# Patient Record
Sex: FEMALE | Race: WHITE | NOT HISPANIC OR LATINO | Employment: FULL TIME | URBAN - METROPOLITAN AREA
[De-identification: names, ages, dates, MRNs, and addresses within clinical notes are randomized per-mention and may not be internally consistent; named-entity substitution may affect disease eponyms.]

---

## 2023-03-13 DIAGNOSIS — G43.009 MIGRAINE WITHOUT AURA AND WITHOUT STATUS MIGRAINOSUS, NOT INTRACTABLE: Primary | ICD-10-CM

## 2023-03-13 DIAGNOSIS — E61.1 IRON DEFICIENCY: ICD-10-CM

## 2023-03-13 RX ORDER — TOPIRAMATE 50 MG/1
50 TABLET, FILM COATED ORAL DAILY
Qty: 90 TABLET | Refills: 1 | Status: SHIPPED | OUTPATIENT
Start: 2023-03-13 | End: 2023-08-17 | Stop reason: SDUPTHER

## 2023-03-13 RX ORDER — TOPIRAMATE 50 MG/1
50 TABLET, FILM COATED ORAL DAILY
COMMUNITY
End: 2023-03-13 | Stop reason: SDUPTHER

## 2023-03-13 RX ORDER — FERROUS SULFATE TAB 325 MG (65 MG ELEMENTAL FE) 325 (65 FE) MG
65 TAB ORAL DAILY
COMMUNITY
Start: 2023-01-23 | End: 2023-03-13 | Stop reason: SDUPTHER

## 2023-03-13 RX ORDER — FERROUS SULFATE TAB 325 MG (65 MG ELEMENTAL FE) 325 (65 FE) MG
65 TAB ORAL DAILY
Qty: 90 TABLET | Refills: 1 | Status: SHIPPED | OUTPATIENT
Start: 2023-03-13 | End: 2023-09-25 | Stop reason: SDUPTHER

## 2023-08-17 ENCOUNTER — OFFICE VISIT (OUTPATIENT)
Dept: PRIMARY CARE | Facility: CLINIC | Age: 28
End: 2023-08-17
Payer: COMMERCIAL

## 2023-08-17 VITALS
DIASTOLIC BLOOD PRESSURE: 60 MMHG | BODY MASS INDEX: 18.18 KG/M2 | RESPIRATION RATE: 18 BRPM | HEIGHT: 67 IN | HEART RATE: 58 BPM | OXYGEN SATURATION: 93 % | WEIGHT: 115.8 LBS | SYSTOLIC BLOOD PRESSURE: 104 MMHG

## 2023-08-17 DIAGNOSIS — E61.1 LOW IRON: ICD-10-CM

## 2023-08-17 DIAGNOSIS — G43.009 MIGRAINE WITHOUT AURA AND WITHOUT STATUS MIGRAINOSUS, NOT INTRACTABLE: Primary | ICD-10-CM

## 2023-08-17 PROCEDURE — 1036F TOBACCO NON-USER: CPT | Performed by: STUDENT IN AN ORGANIZED HEALTH CARE EDUCATION/TRAINING PROGRAM

## 2023-08-17 PROCEDURE — 99213 OFFICE O/P EST LOW 20 MIN: CPT | Performed by: STUDENT IN AN ORGANIZED HEALTH CARE EDUCATION/TRAINING PROGRAM

## 2023-08-17 RX ORDER — TIZANIDINE 2 MG/1
TABLET ORAL
COMMUNITY
Start: 2022-09-29 | End: 2024-02-22 | Stop reason: WASHOUT

## 2023-08-17 RX ORDER — SUMATRIPTAN 50 MG/1
TABLET, FILM COATED ORAL
COMMUNITY
Start: 2023-07-13

## 2023-08-17 RX ORDER — TRETINOIN 0.25 MG/G
CREAM TOPICAL
COMMUNITY
Start: 2023-07-25 | End: 2024-02-22 | Stop reason: WASHOUT

## 2023-08-17 RX ORDER — TOPIRAMATE 50 MG/1
50 TABLET, FILM COATED ORAL DAILY
Qty: 90 TABLET | Refills: 1 | Status: SHIPPED | OUTPATIENT
Start: 2023-08-17 | End: 2024-02-26 | Stop reason: WASHOUT

## 2023-08-17 ASSESSMENT — PATIENT HEALTH QUESTIONNAIRE - PHQ9
1. LITTLE INTEREST OR PLEASURE IN DOING THINGS: NOT AT ALL
2. FEELING DOWN, DEPRESSED OR HOPELESS: NOT AT ALL
SUM OF ALL RESPONSES TO PHQ9 QUESTIONS 1 AND 2: 0

## 2023-08-17 NOTE — PROGRESS NOTES
Subjective   Patient ID: Olga Lidia Negro is a 27 y.o. female who presents for Follow-up (Pt is here for a follow up.).    Patient presents to the office today for follow-up.       PMhx: low ferritin, previously low B12   Family Hx: non-contributory   Surgeries   Medicine:   Social Hx: no tobacco, 4 times a year: alcohol, illicit drugs none, , dog, [employer]ly D[employer]   Allergies: NKDA              Review of Systems    Objective   Physical Exam  Vitals reviewed.   Constitutional:       Appearance: Normal appearance.   Cardiovascular:      Rate and Rhythm: Normal rate and regular rhythm.      Heart sounds: No murmur heard.  Pulmonary:      Effort: Pulmonary effort is normal. No respiratory distress.      Breath sounds: Normal breath sounds. No wheezing.   Musculoskeletal:      Cervical back: Neck supple.      Left lower leg: No edema.   Neurological:      Mental Status: She is alert.         Assessment/Plan   Problem List Items Addressed This Visit    None  Visit Diagnoses       Migraine without aura and without status migrainosus, not intractable    -  Primary    stable   continue topamax 50mg daily    Relevant Medications    topiramate (Topamax) 50 mg tablet    Low iron        Relevant Orders    CBC and Auto Differential    Iron and TIBC    Ferritin

## 2023-08-29 ENCOUNTER — HOSPITAL ENCOUNTER (OUTPATIENT)
Dept: DATA CONVERSION | Facility: HOSPITAL | Age: 28
Discharge: HOME | End: 2023-08-29
Payer: COMMERCIAL

## 2023-08-29 DIAGNOSIS — E61.1 IRON DEFICIENCY: ICD-10-CM

## 2023-08-29 LAB
FERRITIN SERPL-MCNC: 174 NG/ML (ref 13–150)
IRON SATN MFR SERPL: 60.4 % (ref 12–50)
IRON SERPL-MCNC: 151 UG/DL (ref 30–160)
TIBC SERPL-MCNC: 250 UG/DL (ref 228–428)

## 2023-09-06 ENCOUNTER — HOSPITAL ENCOUNTER (OUTPATIENT)
Dept: DATA CONVERSION | Facility: HOSPITAL | Age: 28
Discharge: HOME | End: 2023-09-06
Payer: COMMERCIAL

## 2023-09-06 DIAGNOSIS — N93.8 OTHER SPECIFIED ABNORMAL UTERINE AND VAGINAL BLEEDING: ICD-10-CM

## 2023-09-06 DIAGNOSIS — Z01.419 ENCOUNTER FOR GYNECOLOGICAL EXAMINATION (GENERAL) (ROUTINE) WITHOUT ABNORMAL FINDINGS: ICD-10-CM

## 2023-09-06 LAB
FSH SERPL-ACNC: 5.6 MU/ML
LH SERPL-ACNC: 18.9 MU/ML
PROLACTIN SERPL-MCNC: 8.8 NG/ML (ref 4.8–23.3)
T4 FREE SERPL-MCNC: 1.1 NG/DL (ref 0.9–1.7)
TSH SERPL DL<=0.05 MIU/L-ACNC: 2.31 MIU/L (ref 0.27–4.2)

## 2023-09-21 ENCOUNTER — TELEPHONE (OUTPATIENT)
Dept: PRIMARY CARE | Facility: CLINIC | Age: 28
End: 2023-09-21
Payer: COMMERCIAL

## 2023-09-21 DIAGNOSIS — E61.1 IRON DEFICIENCY: ICD-10-CM

## 2023-09-25 RX ORDER — FERROUS SULFATE 325(65) MG
65 TABLET ORAL DAILY
Qty: 90 TABLET | Refills: 1 | Status: SHIPPED | OUTPATIENT
Start: 2023-09-25

## 2024-02-22 ENCOUNTER — OFFICE VISIT (OUTPATIENT)
Dept: PRIMARY CARE | Facility: CLINIC | Age: 29
End: 2024-02-22
Payer: COMMERCIAL

## 2024-02-22 ENCOUNTER — HOSPITAL ENCOUNTER (OUTPATIENT)
Dept: RADIOLOGY | Facility: CLINIC | Age: 29
Discharge: HOME | End: 2024-02-22
Payer: COMMERCIAL

## 2024-02-22 VITALS
WEIGHT: 127.2 LBS | RESPIRATION RATE: 15 BRPM | HEIGHT: 67 IN | HEART RATE: 73 BPM | BODY MASS INDEX: 19.97 KG/M2 | OXYGEN SATURATION: 96 % | DIASTOLIC BLOOD PRESSURE: 64 MMHG | SYSTOLIC BLOOD PRESSURE: 112 MMHG

## 2024-02-22 DIAGNOSIS — Z00.00 ANNUAL PHYSICAL EXAM: Primary | ICD-10-CM

## 2024-02-22 DIAGNOSIS — M53.3 COCCYX PAIN: ICD-10-CM

## 2024-02-22 DIAGNOSIS — G43.009 MIGRAINE WITHOUT AURA AND WITHOUT STATUS MIGRAINOSUS, NOT INTRACTABLE: ICD-10-CM

## 2024-02-22 PROCEDURE — 72220 X-RAY EXAM SACRUM TAILBONE: CPT

## 2024-02-22 PROCEDURE — 99213 OFFICE O/P EST LOW 20 MIN: CPT | Performed by: STUDENT IN AN ORGANIZED HEALTH CARE EDUCATION/TRAINING PROGRAM

## 2024-02-22 PROCEDURE — 72220 X-RAY EXAM SACRUM TAILBONE: CPT | Performed by: RADIOLOGY

## 2024-02-22 PROCEDURE — 1036F TOBACCO NON-USER: CPT | Performed by: STUDENT IN AN ORGANIZED HEALTH CARE EDUCATION/TRAINING PROGRAM

## 2024-02-22 PROCEDURE — 99395 PREV VISIT EST AGE 18-39: CPT | Performed by: STUDENT IN AN ORGANIZED HEALTH CARE EDUCATION/TRAINING PROGRAM

## 2024-02-22 RX ORDER — NITROFURANTOIN 25; 75 MG/1; MG/1
CAPSULE ORAL
COMMUNITY
Start: 2024-02-20

## 2024-02-22 RX ORDER — METFORMIN HYDROCHLORIDE 500 MG/1
TABLET ORAL
COMMUNITY

## 2024-02-22 ASSESSMENT — PATIENT HEALTH QUESTIONNAIRE - PHQ9
1. LITTLE INTEREST OR PLEASURE IN DOING THINGS: NOT AT ALL
SUM OF ALL RESPONSES TO PHQ9 QUESTIONS 1 AND 2: 0
2. FEELING DOWN, DEPRESSED OR HOPELESS: NOT AT ALL

## 2024-02-22 NOTE — PROGRESS NOTES
History Of Present Illness  Olga Lidia Suosa is a 28 y.o. female presents to the office for cpe.    Interval Hx:     Tailbone pain x 3 months   Tailbone, no trauma  Dull, intense pain with initial sitting and standing   Coxy pain, no fall  No swelling, warmth or erythema   Ice and heat        PCOS Dx   Following with GYN  Metformin        PMhx: low ferritin, previously low B12   Family Hx: non-contributory   Surgeries   Medicine:   Social Hx: no tobacco, 4 times a year: alcohol, illicit drugs none, , dog, Ally Derm   Allergies: NKDA     Topamax cessation, pt noticed increase in weight gain   Past Medical History  She has a past medical history of Migraine, unspecified, not intractable, without status migrainosus, Personal history of other diseases of the digestive system, and Personal history of other diseases of the musculoskeletal system and connective tissue.    Surgical History  She has a past surgical history that includes Other surgical history (09/24/2019) and Other surgical history (09/24/2019).     Social History  She reports that she has never smoked. She has never used smokeless tobacco. She reports current alcohol use. She reports that she does not use drugs.    Family History  Family History   Problem Relation Name Age of Onset    No Known Problems Mother      No Known Problems Father          Allergies  Patient has no known allergies.       Physical Exam  Vitals reviewed.   Constitutional:       General: She is not in acute distress.     Appearance: She is not ill-appearing.   HENT:      Right Ear: Tympanic membrane and ear canal normal.      Left Ear: Tympanic membrane and ear canal normal.      Mouth/Throat:      Mouth: Mucous membranes are moist.      Pharynx: Oropharynx is clear. No oropharyngeal exudate or posterior oropharyngeal erythema.   Eyes:      Extraocular Movements: Extraocular movements intact.      Conjunctiva/sclera: Conjunctivae normal.      Pupils: Pupils are equal, round, and  reactive to light.   Neck:      Vascular: No carotid bruit.   Cardiovascular:      Rate and Rhythm: Normal rate and regular rhythm.      Heart sounds: No murmur heard.     No gallop.   Pulmonary:      Effort: Pulmonary effort is normal.      Breath sounds: Normal breath sounds. No wheezing, rhonchi or rales.   Abdominal:      General: Abdomen is flat. Bowel sounds are normal.      Palpations: Abdomen is soft.      Tenderness: There is no abdominal tenderness.   Musculoskeletal:      Cervical back: Neck supple.      Left lower leg: No edema.   Skin:     General: Skin is warm and dry.      Findings: No rash.   Neurological:      General: No focal deficit present.      Mental Status: She is alert and oriented to person, place, and time.      Gait: Gait normal.   Psychiatric:         Mood and Affect: Mood normal.         Behavior: Behavior normal.          Last Recorded Vitals  /64   Pulse 73   Resp 15   Wt 57.7 kg (127 lb 3.2 oz)   SpO2 96%     Relevant Results    Current Outpatient Medications:     ferrous sulfate (FeroSuL) 325 (65 Fe) MG tablet, Take 1 tablet (65 mg of iron) by mouth once daily., Disp: 90 tablet, Rfl: 1    nitrofurantoin, macrocrystal-monohydrate, (Macrobid) 100 mg capsule, , Disp: , Rfl:     SUMAtriptan (Imitrex) 50 mg tablet, , Disp: , Rfl:     metFORMIN (Glucophage) 500 mg tablet, TAKE 1 TABLET BY MOUTH TWICE DAILY WITH A MEAL, Disp: , Rfl:     topiramate (Topamax) 50 mg tablet, Take 1 tablet (50 mg) by mouth once daily. (Patient not taking: Reported on 2/22/2024), Disp: 90 tablet, Rfl: 1        Assessment/Plan   Problem List Items Addressed This Visit    None  Visit Diagnoses         Codes    Coccyx pain    -  Primary M53.3    Relevant Orders    XR sacrum coccyx 2+ views (Completed)        Olga Lidia Negro is a 27 year old female who presents for cpe. Her exam was benign. We updated her tdap and did health maintenance screening.     Coccyx pain   Likely msk   Xray ordered              Pam Lee, DO

## 2024-08-08 ENCOUNTER — LAB REQUISITION (OUTPATIENT)
Dept: LAB | Facility: HOSPITAL | Age: 29
End: 2024-08-08
Payer: COMMERCIAL

## 2024-08-08 DIAGNOSIS — Z34.01 ENCOUNTER FOR SUPERVISION OF NORMAL FIRST PREGNANCY, FIRST TRIMESTER (HHS-HCC): ICD-10-CM

## 2024-08-08 PROCEDURE — 87491 CHLMYD TRACH DNA AMP PROBE: CPT

## 2024-08-08 PROCEDURE — 87591 N.GONORRHOEAE DNA AMP PROB: CPT

## 2024-08-09 LAB
C TRACH RRNA SPEC QL NAA+PROBE: NEGATIVE
N GONORRHOEA DNA SPEC QL PROBE+SIG AMP: NEGATIVE

## 2024-08-19 ENCOUNTER — LAB (OUTPATIENT)
Dept: LAB | Facility: LAB | Age: 29
End: 2024-08-19
Payer: COMMERCIAL

## 2024-08-19 DIAGNOSIS — Z13.79 ENCOUNTER FOR OTHER SCREENING FOR GENETIC AND CHROMOSOMAL ANOMALIES: Primary | ICD-10-CM

## 2024-08-19 DIAGNOSIS — Z34.01 ENCOUNTER FOR SUPERVISION OF NORMAL FIRST PREGNANCY, FIRST TRIMESTER (HHS-HCC): ICD-10-CM

## 2024-08-19 LAB
ABO GROUP (TYPE) IN BLOOD: NORMAL
ANTIBODY SCREEN: NORMAL
ERYTHROCYTE [DISTWIDTH] IN BLOOD BY AUTOMATED COUNT: 11.3 % (ref 11.5–14.5)
EST. AVERAGE GLUCOSE BLD GHB EST-MCNC: 105 MG/DL
HBA1C MFR BLD: 5.3 %
HCT VFR BLD AUTO: 37.6 % (ref 36–46)
HGB BLD-MCNC: 12.9 G/DL (ref 12–16)
MCH RBC QN AUTO: 31.4 PG (ref 26–34)
MCHC RBC AUTO-ENTMCNC: 34.3 G/DL (ref 32–36)
MCV RBC AUTO: 92 FL (ref 80–100)
NRBC BLD-RTO: 0 /100 WBCS (ref 0–0)
PLATELET # BLD AUTO: 280 X10*3/UL (ref 150–450)
RBC # BLD AUTO: 4.11 X10*6/UL (ref 4–5.2)
RH FACTOR (ANTIGEN D): NORMAL
WBC # BLD AUTO: 10.2 X10*3/UL (ref 4.4–11.3)

## 2024-08-19 PROCEDURE — 87389 HIV-1 AG W/HIV-1&-2 AB AG IA: CPT

## 2024-08-19 PROCEDURE — 86900 BLOOD TYPING SEROLOGIC ABO: CPT

## 2024-08-19 PROCEDURE — 86901 BLOOD TYPING SEROLOGIC RH(D): CPT

## 2024-08-19 PROCEDURE — 87340 HEPATITIS B SURFACE AG IA: CPT

## 2024-08-19 PROCEDURE — 86317 IMMUNOASSAY INFECTIOUS AGENT: CPT

## 2024-08-19 PROCEDURE — 87086 URINE CULTURE/COLONY COUNT: CPT

## 2024-08-19 PROCEDURE — 83036 HEMOGLOBIN GLYCOSYLATED A1C: CPT

## 2024-08-19 PROCEDURE — 86780 TREPONEMA PALLIDUM: CPT

## 2024-08-19 PROCEDURE — 36415 COLL VENOUS BLD VENIPUNCTURE: CPT

## 2024-08-19 PROCEDURE — 85027 COMPLETE CBC AUTOMATED: CPT

## 2024-08-19 PROCEDURE — 86803 HEPATITIS C AB TEST: CPT

## 2024-08-19 PROCEDURE — 86850 RBC ANTIBODY SCREEN: CPT

## 2024-08-20 LAB
BACTERIA UR CULT: NORMAL
HBV SURFACE AG SERPL QL IA: NONREACTIVE
HCV AB SER QL: NONREACTIVE
HIV 1+2 AB+HIV1 P24 AG SERPL QL IA: NONREACTIVE
RUBV IGG SERPL IA-ACNC: 1.8 IA
RUBV IGG SERPL QL IA: POSITIVE
TREPONEMA PALLIDUM IGG+IGM AB [PRESENCE] IN SERUM OR PLASMA BY IMMUNOASSAY: NONREACTIVE

## 2024-08-21 LAB
COMMENTS - MP RESULT TYPE: NORMAL
COMMENTS - MP RESULT TYPE: NORMAL
SCAN RESULT: NORMAL
SCAN RESULT: NORMAL

## 2024-09-04 ENCOUNTER — APPOINTMENT (OUTPATIENT)
Dept: OTOLARYNGOLOGY | Facility: CLINIC | Age: 29
End: 2024-09-04
Payer: COMMERCIAL

## 2024-10-14 ENCOUNTER — APPOINTMENT (OUTPATIENT)
Dept: OTOLARYNGOLOGY | Facility: CLINIC | Age: 29
End: 2024-10-14
Payer: COMMERCIAL

## 2024-12-14 ENCOUNTER — LAB (OUTPATIENT)
Dept: LAB | Facility: LAB | Age: 29
End: 2024-12-14
Payer: COMMERCIAL

## 2024-12-14 DIAGNOSIS — Z34.02 ENCOUNTER FOR SUPERVISION OF NORMAL FIRST PREGNANCY, SECOND TRIMESTER: Primary | ICD-10-CM

## 2024-12-14 LAB
ERYTHROCYTE [DISTWIDTH] IN BLOOD BY AUTOMATED COUNT: 12.3 % (ref 11.5–14.5)
GLUCOSE 1H P 50 G GLC PO SERPL-MCNC: 93 MG/DL
HCT VFR BLD AUTO: 35.8 % (ref 36–46)
HGB BLD-MCNC: 12 G/DL (ref 12–16)
MCH RBC QN AUTO: 33.1 PG (ref 26–34)
MCHC RBC AUTO-ENTMCNC: 33.5 G/DL (ref 32–36)
MCV RBC AUTO: 99 FL (ref 80–100)
NRBC BLD-RTO: 0 /100 WBCS (ref 0–0)
PLATELET # BLD AUTO: 231 X10*3/UL (ref 150–450)
RBC # BLD AUTO: 3.63 X10*6/UL (ref 4–5.2)
REFLEX ADDED, ANEMIA PANEL: NORMAL
WBC # BLD AUTO: 8.8 X10*3/UL (ref 4.4–11.3)

## 2024-12-14 PROCEDURE — 36415 COLL VENOUS BLD VENIPUNCTURE: CPT

## 2024-12-14 PROCEDURE — 85027 COMPLETE CBC AUTOMATED: CPT

## 2024-12-14 PROCEDURE — 82947 ASSAY GLUCOSE BLOOD QUANT: CPT

## 2025-02-26 ASSESSMENT — PROMIS GLOBAL HEALTH SCALE
RATE_QUALITY_OF_LIFE: EXCELLENT
RATE_AVERAGE_FATIGUE: MODERATE
RATE_AVERAGE_PAIN: 5
CARRYOUT_PHYSICAL_ACTIVITIES: MOSTLY
RATE_MENTAL_HEALTH: EXCELLENT
RATE_PHYSICAL_HEALTH: VERY GOOD
EMOTIONAL_PROBLEMS: NEVER
RATE_SOCIAL_SATISFACTION: VERY GOOD
RATE_GENERAL_HEALTH: VERY GOOD
CARRYOUT_SOCIAL_ACTIVITIES: EXCELLENT

## 2025-02-27 ENCOUNTER — APPOINTMENT (OUTPATIENT)
Dept: PRIMARY CARE | Facility: CLINIC | Age: 30
End: 2025-02-27
Payer: COMMERCIAL

## 2025-02-27 VITALS
BODY MASS INDEX: 25.24 KG/M2 | HEART RATE: 94 BPM | SYSTOLIC BLOOD PRESSURE: 102 MMHG | OXYGEN SATURATION: 100 % | DIASTOLIC BLOOD PRESSURE: 58 MMHG | HEIGHT: 67 IN | WEIGHT: 160.8 LBS

## 2025-02-27 DIAGNOSIS — Z00.00 ANNUAL PHYSICAL EXAM: Primary | ICD-10-CM

## 2025-02-27 DIAGNOSIS — Z34.90 PREGNANCY, UNSPECIFIED GESTATIONAL AGE (HHS-HCC): ICD-10-CM

## 2025-02-27 PROCEDURE — 3008F BODY MASS INDEX DOCD: CPT | Performed by: STUDENT IN AN ORGANIZED HEALTH CARE EDUCATION/TRAINING PROGRAM

## 2025-02-27 PROCEDURE — 99395 PREV VISIT EST AGE 18-39: CPT | Performed by: STUDENT IN AN ORGANIZED HEALTH CARE EDUCATION/TRAINING PROGRAM

## 2025-02-27 PROCEDURE — 1036F TOBACCO NON-USER: CPT | Performed by: STUDENT IN AN ORGANIZED HEALTH CARE EDUCATION/TRAINING PROGRAM

## 2025-02-27 ASSESSMENT — PATIENT HEALTH QUESTIONNAIRE - PHQ9
SUM OF ALL RESPONSES TO PHQ9 QUESTIONS 1 AND 2: 0
2. FEELING DOWN, DEPRESSED OR HOPELESS: NOT AT ALL
1. LITTLE INTEREST OR PLEASURE IN DOING THINGS: NOT AT ALL

## 2025-02-27 NOTE — PROGRESS NOTES
History Of Present Illness  Olga Lidia Sousa is a 29 y.o. female presents for cpe.      Pregnancy 37 weeks   Getting induced on the 3/12   Baby moving well     PMhx: low ferritin, previously low B12   Family Hx: non-contributory   Surgeries   Medicine:   Social Hx: no tobacco, 4 times a year: alcohol, illicit drugs none, , dog, Ally Derm   Allergies: NKDA      Migraines  Hasn't had one since July, 1 the entire pregnancy      Past Medical History  She has a past medical history of GERD (gastroesophageal reflux disease), Migraine, unspecified, not intractable, without status migrainosus, Personal history of other diseases of the digestive system, and Personal history of other diseases of the musculoskeletal system and connective tissue.    Surgical History  She has a past surgical history that includes Other surgical history (09/24/2019) and Other surgical history (09/24/2019).     Social History  She reports that she has never smoked. She has never used smokeless tobacco. She reports current alcohol use. She reports that she does not use drugs.    Family History  Family History   Problem Relation Name Age of Onset    No Known Problems Mother      No Known Problems Father          Allergies  Patient has no known allergies.    Physical Exam  Vitals reviewed.   Constitutional:       General: She is not in acute distress.     Appearance: She is not ill-appearing.   HENT:      Right Ear: Tympanic membrane and ear canal normal.      Left Ear: Tympanic membrane and ear canal normal.      Mouth/Throat:      Mouth: Mucous membranes are moist.      Pharynx: Oropharynx is clear. No oropharyngeal exudate or posterior oropharyngeal erythema.   Eyes:      Extraocular Movements: Extraocular movements intact.      Conjunctiva/sclera: Conjunctivae normal.      Pupils: Pupils are equal, round, and reactive to light.   Neck:      Vascular: No carotid bruit.   Cardiovascular:      Rate and Rhythm: Normal rate and regular rhythm.       Heart sounds: No murmur heard.     No gallop.   Pulmonary:      Effort: Pulmonary effort is normal.      Breath sounds: Normal breath sounds. No wheezing, rhonchi or rales.   Abdominal:      General: Abdomen is flat. Bowel sounds are normal.      Palpations: Abdomen is soft.      Tenderness: There is no abdominal tenderness.   Musculoskeletal:      Cervical back: Neck supple.      Left lower leg: No edema.   Skin:     General: Skin is warm and dry.      Findings: No rash.   Neurological:      General: No focal deficit present.      Mental Status: She is alert and oriented to person, place, and time.      Gait: Gait normal.   Psychiatric:         Mood and Affect: Mood normal.         Behavior: Behavior normal.          Last Recorded Vitals  /58   Pulse 94   Wt 72.9 kg (160 lb 12.8 oz)   SpO2 100%     Relevant Results      Current Outpatient Medications:     ferrous sulfate (FeroSuL) 325 (65 Fe) MG tablet, Take 1 tablet (65 mg of iron) by mouth once daily., Disp: 90 tablet, Rfl: 1         Assessment/Plan   There are no diagnoses linked to this encounter.  Olga Lidia Negro is a 27 year old female who presents for cpe. Her exam was sig for pregnancy.      Migraines   Resolved       Health Maintenance   Topic Date Due    Yearly Adult Physical  Never done    Varicella Vaccines (2 of 2 - 2-dose childhood series) 10/08/2003    Influenza Vaccine (1) 09/01/2024    COVID-19 Vaccine (3 - 2024-25 season) 09/01/2024    Lipid Panel  08/19/2025    Cervical Cancer Screening  09/06/2026    DTaP/Tdap/Td Vaccines (9 - Td or Tdap) 02/21/2033    Zoster Vaccines (1 of 2) 09/05/2047    HIB Vaccines  Completed    Hepatitis B Vaccines  Completed    IPV Vaccines  Completed    Hepatitis A Vaccines  Completed    MMR Vaccines  Completed    Meningococcal Vaccine  Completed    HPV Vaccines  Completed    HIV Screening  Completed    Hepatitis C Screening  Completed    Rotavirus Vaccines  Aged Out    Pneumococcal Vaccine: Pediatrics and  At-Risk Adult Patients  Aged Out            Pam Lee DO

## 2025-03-12 ENCOUNTER — APPOINTMENT (OUTPATIENT)
Dept: OBSTETRICS AND GYNECOLOGY | Facility: HOSPITAL | Age: 30
End: 2025-03-12
Payer: COMMERCIAL

## 2025-03-12 ENCOUNTER — ANESTHESIA EVENT (OUTPATIENT)
Dept: OBSTETRICS AND GYNECOLOGY | Facility: HOSPITAL | Age: 30
End: 2025-03-12
Payer: COMMERCIAL

## 2025-03-12 ENCOUNTER — HOSPITAL ENCOUNTER (INPATIENT)
Facility: HOSPITAL | Age: 30
LOS: 3 days | Discharge: HOME | End: 2025-03-15
Payer: COMMERCIAL

## 2025-03-12 ENCOUNTER — ANESTHESIA (OUTPATIENT)
Dept: OBSTETRICS AND GYNECOLOGY | Facility: HOSPITAL | Age: 30
End: 2025-03-12
Payer: COMMERCIAL

## 2025-03-12 DIAGNOSIS — Z34.90 TERM PREGNANCY (HHS-HCC): Primary | ICD-10-CM

## 2025-03-12 LAB
ABO GROUP (TYPE) IN BLOOD: NORMAL
ANTIBODY SCREEN: NORMAL
ERYTHROCYTE [DISTWIDTH] IN BLOOD BY AUTOMATED COUNT: 12.1 % (ref 11.5–14.5)
HCT VFR BLD AUTO: 33.3 % (ref 36–46)
HGB BLD-MCNC: 11.1 G/DL (ref 12–16)
MCH RBC QN AUTO: 30.8 PG (ref 26–34)
MCHC RBC AUTO-ENTMCNC: 33.3 G/DL (ref 32–36)
MCV RBC AUTO: 93 FL (ref 80–100)
NRBC BLD-RTO: 0 /100 WBCS (ref 0–0)
PLATELET # BLD AUTO: 284 X10*3/UL (ref 150–450)
RBC # BLD AUTO: 3.6 X10*6/UL (ref 4–5.2)
RH FACTOR (ANTIGEN D): NORMAL
WBC # BLD AUTO: 11.4 X10*3/UL (ref 4.4–11.3)

## 2025-03-12 PROCEDURE — 59050 FETAL MONITOR W/REPORT: CPT

## 2025-03-12 PROCEDURE — 2500000004 HC RX 250 GENERAL PHARMACY W/ HCPCS (ALT 636 FOR OP/ED): Performed by: NURSE ANESTHETIST, CERTIFIED REGISTERED

## 2025-03-12 PROCEDURE — 3700000014 EPIDURAL BLOCK: Performed by: NURSE ANESTHETIST, CERTIFIED REGISTERED

## 2025-03-12 PROCEDURE — 85027 COMPLETE CBC AUTOMATED: CPT

## 2025-03-12 PROCEDURE — 86901 BLOOD TYPING SEROLOGIC RH(D): CPT

## 2025-03-12 PROCEDURE — 7210000002 HC LABOR PER HOUR

## 2025-03-12 PROCEDURE — 2500000004 HC RX 250 GENERAL PHARMACY W/ HCPCS (ALT 636 FOR OP/ED)

## 2025-03-12 PROCEDURE — 86780 TREPONEMA PALLIDUM: CPT | Mod: TRILAB

## 2025-03-12 PROCEDURE — 51701 INSERT BLADDER CATHETER: CPT

## 2025-03-12 PROCEDURE — 3E033VJ INTRODUCTION OF OTHER HORMONE INTO PERIPHERAL VEIN, PERCUTANEOUS APPROACH: ICD-10-PCS

## 2025-03-12 PROCEDURE — 36415 COLL VENOUS BLD VENIPUNCTURE: CPT

## 2025-03-12 PROCEDURE — 10907ZC DRAINAGE OF AMNIOTIC FLUID, THERAPEUTIC FROM PRODUCTS OF CONCEPTION, VIA NATURAL OR ARTIFICIAL OPENING: ICD-10-PCS

## 2025-03-12 PROCEDURE — 1220000001 HC OB SEMI-PRIVATE ROOM DAILY

## 2025-03-12 PROCEDURE — 01967 NEURAXL LBR ANES VAG DLVR: CPT | Performed by: NURSE ANESTHETIST, CERTIFIED REGISTERED

## 2025-03-12 RX ORDER — ONDANSETRON 4 MG/1
4 TABLET, FILM COATED ORAL EVERY 6 HOURS PRN
Status: DISCONTINUED | OUTPATIENT
Start: 2025-03-12 | End: 2025-03-13

## 2025-03-12 RX ORDER — FENTANYL/ROPIVACAINE/NS/PF 2MCG/ML-.2
0-25 PLASTIC BAG, INJECTION (ML) INJECTION CONTINUOUS
Status: DISCONTINUED | OUTPATIENT
Start: 2025-03-12 | End: 2025-03-13

## 2025-03-12 RX ORDER — NALBUPHINE HYDROCHLORIDE 10 MG/ML
10 INJECTION INTRAMUSCULAR; INTRAVENOUS; SUBCUTANEOUS
Status: DISCONTINUED | OUTPATIENT
Start: 2025-03-12 | End: 2025-03-13

## 2025-03-12 RX ORDER — ONDANSETRON HYDROCHLORIDE 2 MG/ML
4 INJECTION, SOLUTION INTRAVENOUS EVERY 6 HOURS PRN
Status: DISCONTINUED | OUTPATIENT
Start: 2025-03-12 | End: 2025-03-13

## 2025-03-12 RX ORDER — MISOPROSTOL 200 UG/1
800 TABLET ORAL ONCE AS NEEDED
Status: DISCONTINUED | OUTPATIENT
Start: 2025-03-12 | End: 2025-03-13

## 2025-03-12 RX ORDER — METHYLERGONOVINE MALEATE 0.2 MG/ML
0.2 INJECTION INTRAVENOUS ONCE AS NEEDED
Status: DISCONTINUED | OUTPATIENT
Start: 2025-03-12 | End: 2025-03-13

## 2025-03-12 RX ORDER — OXYTOCIN/0.9 % SODIUM CHLORIDE 30/500 ML
2-30 PLASTIC BAG, INJECTION (ML) INTRAVENOUS CONTINUOUS
Status: DISCONTINUED | OUTPATIENT
Start: 2025-03-12 | End: 2025-03-13

## 2025-03-12 RX ORDER — SODIUM CHLORIDE, SODIUM LACTATE, POTASSIUM CHLORIDE, CALCIUM CHLORIDE 600; 310; 30; 20 MG/100ML; MG/100ML; MG/100ML; MG/100ML
125 INJECTION, SOLUTION INTRAVENOUS CONTINUOUS
Status: DISCONTINUED | OUTPATIENT
Start: 2025-03-12 | End: 2025-03-13

## 2025-03-12 RX ORDER — OXYTOCIN/0.9 % SODIUM CHLORIDE 30/500 ML
60 PLASTIC BAG, INJECTION (ML) INTRAVENOUS ONCE AS NEEDED
Status: DISCONTINUED | OUTPATIENT
Start: 2025-03-12 | End: 2025-03-13

## 2025-03-12 RX ORDER — CARBOPROST TROMETHAMINE 250 UG/ML
250 INJECTION, SOLUTION INTRAMUSCULAR ONCE AS NEEDED
Status: DISCONTINUED | OUTPATIENT
Start: 2025-03-12 | End: 2025-03-13

## 2025-03-12 RX ORDER — LIDOCAINE HYDROCHLORIDE 10 MG/ML
30 INJECTION, SOLUTION INFILTRATION; PERINEURAL ONCE AS NEEDED
Status: DISCONTINUED | OUTPATIENT
Start: 2025-03-12 | End: 2025-03-13

## 2025-03-12 RX ORDER — TRANEXAMIC ACID 10 MG/ML
1000 INJECTION, SOLUTION INTRAVENOUS ONCE AS NEEDED
Status: DISCONTINUED | OUTPATIENT
Start: 2025-03-12 | End: 2025-03-13

## 2025-03-12 RX ORDER — OXYTOCIN 10 [USP'U]/ML
10 INJECTION, SOLUTION INTRAMUSCULAR; INTRAVENOUS ONCE AS NEEDED
Status: DISCONTINUED | OUTPATIENT
Start: 2025-03-12 | End: 2025-03-13

## 2025-03-12 RX ORDER — OMEPRAZOLE 20 MG/1
20 TABLET, DELAYED RELEASE ORAL
COMMUNITY

## 2025-03-12 RX ORDER — LABETALOL HYDROCHLORIDE 5 MG/ML
20 INJECTION, SOLUTION INTRAVENOUS ONCE AS NEEDED
Status: DISCONTINUED | OUTPATIENT
Start: 2025-03-12 | End: 2025-03-13

## 2025-03-12 RX ORDER — BUPIVACAINE HYDROCHLORIDE 2.5 MG/ML
INJECTION, SOLUTION EPIDURAL; INFILTRATION; INTRACAUDAL AS NEEDED
Status: DISCONTINUED | OUTPATIENT
Start: 2025-03-12 | End: 2025-03-13

## 2025-03-12 RX ORDER — OXYTOCIN/0.9 % SODIUM CHLORIDE 30/500 ML
60 PLASTIC BAG, INJECTION (ML) INTRAVENOUS ONCE AS NEEDED
Status: COMPLETED | OUTPATIENT
Start: 2025-03-12 | End: 2025-03-13

## 2025-03-12 RX ORDER — TERBUTALINE SULFATE 1 MG/ML
0.25 INJECTION SUBCUTANEOUS ONCE AS NEEDED
Status: DISCONTINUED | OUTPATIENT
Start: 2025-03-12 | End: 2025-03-13

## 2025-03-12 RX ORDER — HYDRALAZINE HYDROCHLORIDE 20 MG/ML
5 INJECTION INTRAMUSCULAR; INTRAVENOUS ONCE AS NEEDED
Status: DISCONTINUED | OUTPATIENT
Start: 2025-03-12 | End: 2025-03-13

## 2025-03-12 RX ORDER — LOPERAMIDE HYDROCHLORIDE 2 MG/1
4 CAPSULE ORAL EVERY 2 HOUR PRN
Status: DISCONTINUED | OUTPATIENT
Start: 2025-03-12 | End: 2025-03-13

## 2025-03-12 RX ADMIN — Medication 10 ML/HR: at 19:15

## 2025-03-12 RX ADMIN — BUPIVACAINE HYDROCHLORIDE 3 ML: 2.5 INJECTION, SOLUTION EPIDURAL; INFILTRATION; INTRACAUDAL; PERINEURAL at 20:27

## 2025-03-12 RX ADMIN — Medication 2 MILLI-UNITS/MIN: at 13:43

## 2025-03-12 RX ADMIN — Medication 5 ML: at 19:12

## 2025-03-12 SDOH — HEALTH STABILITY: MENTAL HEALTH: HAVE YOU USED ANY SUBSTANCES (CANABIS, COCAINE, HEROIN, HALLUCINOGENS, INHALANTS, ETC.) IN THE PAST 12 MONTHS?: NO

## 2025-03-12 SDOH — SOCIAL STABILITY: SOCIAL INSECURITY: PHYSICAL ABUSE: DENIES

## 2025-03-12 SDOH — SOCIAL STABILITY: SOCIAL INSECURITY: ABUSE SCREEN: ADULT

## 2025-03-12 SDOH — SOCIAL STABILITY: SOCIAL INSECURITY: WITHIN THE LAST YEAR, HAVE YOU BEEN AFRAID OF YOUR PARTNER OR EX-PARTNER?: NO

## 2025-03-12 SDOH — HEALTH STABILITY: MENTAL HEALTH: WERE YOU ABLE TO COMPLETE ALL THE BEHAVIORAL HEALTH SCREENINGS?: YES

## 2025-03-12 SDOH — SOCIAL STABILITY: SOCIAL INSECURITY: ARE THERE ANY APPARENT SIGNS OF INJURIES/BEHAVIORS THAT COULD BE RELATED TO ABUSE/NEGLECT?: NO

## 2025-03-12 SDOH — HEALTH STABILITY: MENTAL HEALTH
DO YOU FEEL STRESS - TENSE, RESTLESS, NERVOUS, OR ANXIOUS, OR UNABLE TO SLEEP AT NIGHT BECAUSE YOUR MIND IS TROUBLED ALL THE TIME - THESE DAYS?: NOT AT ALL

## 2025-03-12 SDOH — HEALTH STABILITY: PHYSICAL HEALTH
HOW OFTEN DO YOU NEED TO HAVE SOMEONE HELP YOU WHEN YOU READ INSTRUCTIONS, PAMPHLETS, OR OTHER WRITTEN MATERIAL FROM YOUR DOCTOR OR PHARMACY?: NEVER

## 2025-03-12 SDOH — SOCIAL STABILITY: SOCIAL INSECURITY: ARE YOU MARRIED, WIDOWED, DIVORCED, SEPARATED, NEVER MARRIED, OR LIVING WITH A PARTNER?: MARRIED

## 2025-03-12 SDOH — SOCIAL STABILITY: SOCIAL INSECURITY: ARE YOU OR HAVE YOU BEEN THREATENED OR ABUSED PHYSICALLY, EMOTIONALLY, OR SEXUALLY BY ANYONE?: NO

## 2025-03-12 SDOH — SOCIAL STABILITY: SOCIAL INSECURITY: HAVE YOU HAD THOUGHTS OF HARMING ANYONE ELSE?: NO

## 2025-03-12 SDOH — SOCIAL STABILITY: SOCIAL NETWORK: HOW OFTEN DO YOU ATTEND CHURCH OR RELIGIOUS SERVICES?: NEVER

## 2025-03-12 SDOH — SOCIAL STABILITY: SOCIAL INSECURITY
WITHIN THE LAST YEAR, HAVE YOU BEEN KICKED, HIT, SLAPPED, OR OTHERWISE PHYSICALLY HURT BY YOUR PARTNER OR EX-PARTNER?: NO

## 2025-03-12 SDOH — ECONOMIC STABILITY: TRANSPORTATION INSECURITY: IN THE PAST 12 MONTHS, HAS LACK OF TRANSPORTATION KEPT YOU FROM MEDICAL APPOINTMENTS OR FROM GETTING MEDICATIONS?: NO

## 2025-03-12 SDOH — SOCIAL STABILITY: SOCIAL NETWORK: IN A TYPICAL WEEK, HOW MANY TIMES DO YOU TALK ON THE PHONE WITH FAMILY, FRIENDS, OR NEIGHBORS?: ONCE A WEEK

## 2025-03-12 SDOH — SOCIAL STABILITY: SOCIAL INSECURITY
WITHIN THE LAST YEAR, HAVE YOU BEEN RAPED OR FORCED TO HAVE ANY KIND OF SEXUAL ACTIVITY BY YOUR PARTNER OR EX-PARTNER?: NO

## 2025-03-12 SDOH — SOCIAL STABILITY: SOCIAL INSECURITY: WITHIN THE LAST YEAR, HAVE YOU BEEN HUMILIATED OR EMOTIONALLY ABUSED IN OTHER WAYS BY YOUR PARTNER OR EX-PARTNER?: NO

## 2025-03-12 SDOH — SOCIAL STABILITY: SOCIAL NETWORK: HOW OFTEN DO YOU GET TOGETHER WITH FRIENDS OR RELATIVES?: ONCE A WEEK

## 2025-03-12 SDOH — ECONOMIC STABILITY: FOOD INSECURITY: WITHIN THE PAST 12 MONTHS, YOU WORRIED THAT YOUR FOOD WOULD RUN OUT BEFORE YOU GOT THE MONEY TO BUY MORE.: NEVER TRUE

## 2025-03-12 SDOH — ECONOMIC STABILITY: FOOD INSECURITY: WITHIN THE PAST 12 MONTHS, THE FOOD YOU BOUGHT JUST DIDN'T LAST AND YOU DIDN'T HAVE MONEY TO GET MORE.: NEVER TRUE

## 2025-03-12 SDOH — SOCIAL STABILITY: SOCIAL INSECURITY: HAVE YOU HAD ANY THOUGHTS OF HARMING ANYONE ELSE?: NO

## 2025-03-12 SDOH — SOCIAL STABILITY: SOCIAL NETWORK
DO YOU BELONG TO ANY CLUBS OR ORGANIZATIONS SUCH AS CHURCH GROUPS, UNIONS, FRATERNAL OR ATHLETIC GROUPS, OR SCHOOL GROUPS?: NO

## 2025-03-12 SDOH — SOCIAL STABILITY: SOCIAL INSECURITY: HAS ANYONE EVER THREATENED TO HURT YOUR FAMILY OR YOUR PETS?: NO

## 2025-03-12 SDOH — HEALTH STABILITY: MENTAL HEALTH: NON-SPECIFIC ACTIVE SUICIDAL THOUGHTS (PAST 1 MONTH): NO

## 2025-03-12 SDOH — SOCIAL STABILITY: SOCIAL NETWORK: HOW OFTEN DO YOU ATTEND MEETINGS OF THE CLUBS OR ORGANIZATIONS YOU BELONG TO?: 1 TO 4 TIMES PER YEAR

## 2025-03-12 SDOH — SOCIAL STABILITY: SOCIAL INSECURITY: VERBAL ABUSE: DENIES

## 2025-03-12 SDOH — HEALTH STABILITY: MENTAL HEALTH: HAVE YOU USED ANY PRESCRIPTION DRUGS OTHER THAN PRESCRIBED IN THE PAST 12 MONTHS?: NO

## 2025-03-12 SDOH — HEALTH STABILITY: PHYSICAL HEALTH: ON AVERAGE, HOW MANY MINUTES DO YOU ENGAGE IN EXERCISE AT THIS LEVEL?: 10 MIN

## 2025-03-12 SDOH — HEALTH STABILITY: MENTAL HEALTH: WISH TO BE DEAD (PAST 1 MONTH): NO

## 2025-03-12 SDOH — SOCIAL STABILITY: SOCIAL INSECURITY: DO YOU FEEL ANYONE HAS EXPLOITED OR TAKEN ADVANTAGE OF YOU FINANCIALLY OR OF YOUR PERSONAL PROPERTY?: NO

## 2025-03-12 SDOH — HEALTH STABILITY: MENTAL HEALTH: CURRENT SMOKER: 0

## 2025-03-12 SDOH — ECONOMIC STABILITY: FOOD INSECURITY: HOW HARD IS IT FOR YOU TO PAY FOR THE VERY BASICS LIKE FOOD, HOUSING, MEDICAL CARE, AND HEATING?: NOT HARD AT ALL

## 2025-03-12 SDOH — HEALTH STABILITY: MENTAL HEALTH: SUICIDAL BEHAVIOR (LIFETIME): NO

## 2025-03-12 SDOH — SOCIAL STABILITY: SOCIAL INSECURITY: DOES ANYONE TRY TO KEEP YOU FROM HAVING/CONTACTING OTHER FRIENDS OR DOING THINGS OUTSIDE YOUR HOME?: NO

## 2025-03-12 SDOH — ECONOMIC STABILITY: HOUSING INSECURITY: DO YOU FEEL UNSAFE GOING BACK TO THE PLACE WHERE YOU ARE LIVING?: NO

## 2025-03-12 SDOH — HEALTH STABILITY: PHYSICAL HEALTH: ON AVERAGE, HOW MANY DAYS PER WEEK DO YOU ENGAGE IN MODERATE TO STRENUOUS EXERCISE (LIKE A BRISK WALK)?: 0 DAYS

## 2025-03-12 ASSESSMENT — ACTIVITIES OF DAILY LIVING (ADL)
LACK_OF_TRANSPORTATION: NO
JUDGMENT_ADEQUATE_SAFELY_COMPLETE_DAILY_ACTIVITIES: YES
TOILETING: INDEPENDENT
GROOMING: INDEPENDENT
HEARING - RIGHT EAR: FUNCTIONAL
BATHING: INDEPENDENT
HEARING - LEFT EAR: FUNCTIONAL
ADEQUATE_TO_COMPLETE_ADL: YES
LACK_OF_TRANSPORTATION: NO
WALKS IN HOME: INDEPENDENT
DRESSING YOURSELF: INDEPENDENT
FEEDING YOURSELF: INDEPENDENT
PATIENT'S MEMORY ADEQUATE TO SAFELY COMPLETE DAILY ACTIVITIES?: YES

## 2025-03-12 ASSESSMENT — PAIN SCALES - GENERAL
PAINLEVEL_OUTOF10: 0 - NO PAIN
PAINLEVEL_OUTOF10: 0 - NO PAIN
PAINLEVEL_OUTOF10: 7
PAINLEVEL_OUTOF10: 0 - NO PAIN
PAINLEVEL_OUTOF10: 0 - NO PAIN
PAINLEVEL_OUTOF10: 7
PAINLEVEL_OUTOF10: 5 - MODERATE PAIN
PAINLEVEL_OUTOF10: 0 - NO PAIN
PAINLEVEL_OUTOF10: 0 - NO PAIN
PAINLEVEL_OUTOF10: 5 - MODERATE PAIN
PAINLEVEL_OUTOF10: 0 - NO PAIN
PAINLEVEL_OUTOF10: 3
PAINLEVEL_OUTOF10: 5 - MODERATE PAIN
PAINLEVEL_OUTOF10: 0 - NO PAIN
PAINLEVEL_OUTOF10: 5 - MODERATE PAIN

## 2025-03-12 ASSESSMENT — PATIENT HEALTH QUESTIONNAIRE - PHQ9
2. FEELING DOWN, DEPRESSED OR HOPELESS: NOT AT ALL
SUM OF ALL RESPONSES TO PHQ9 QUESTIONS 1 & 2: 0
1. LITTLE INTEREST OR PLEASURE IN DOING THINGS: NOT AT ALL

## 2025-03-12 ASSESSMENT — LIFESTYLE VARIABLES
HOW OFTEN DO YOU HAVE A DRINK CONTAINING ALCOHOL: NEVER
AUDIT-C TOTAL SCORE: 0
HOW OFTEN DO YOU HAVE 6 OR MORE DRINKS ON ONE OCCASION: NEVER
SKIP TO QUESTIONS 9-10: 1
HOW MANY STANDARD DRINKS CONTAINING ALCOHOL DO YOU HAVE ON A TYPICAL DAY: PATIENT DOES NOT DRINK
AUDIT-C TOTAL SCORE: 0

## 2025-03-12 NOTE — ANESTHESIA PROCEDURE NOTES
Epidural Block    Patient location during procedure: OB  Start time: 3/12/2025 7:07 PM  End time: 3/12/2025 7:15 PM  Reason for block: labor analgesia  Staffing  Performed: CRNA   Authorized by: RUBEN Briseno    Performed by: RUBEN Briseno    Preanesthetic Checklist  Completed: patient identified, IV checked, risks and benefits discussed, surgical consent, pre-op evaluation, timeout performed and sterile techniques followed  Block Timeout  RN/Licensed healthcare professional reads aloud to the Anesthesia provider and entire team: Patient identity, procedure with side and site, patient position, and as applicable the availability of implants/special equipment/special requirements.  Patient on coagulant treatment: no  Timeout performed at: 3/12/2025 7:07 AM  Block Placement  Patient position: sitting  Prep: ChloraPrep  Sterility prep: cap, drape, gloves, hand and mask  Sedation level: no sedation  Patient monitoring: heart rate, continuous pulse oximetry and blood pressure  Approach: midline  Local numbing: lidocaine 1% to skin and subcutaneous tissues  Vertebral space: lumbar  Lumbar location: L3-L4  Epidural  Loss of resistance technique: saline  Guidance: ultrasound guided        Needle  Needle type: Tuohy   Needle gauge: 17  Needle length: 9 cm  Needle insertion depth: 4 cm  Catheter type: end hole  Catheter size: 19 G  Catheter at skin depth: 9 cm  Catheter securement method: clear occlusive dressing and liquid medical adhesive    Test dose: lidocaine 1.5% with epinephrine 1-to-200,000  Test dose: lidocaine 1.5% with epinephrine 1-to-200,000  Test dose result: no positive test dose    PCEA  Medication concentration used: 0.2% Ropivacaine with 2 mcg/mL Fentanyl  Dose (mL): 5  Lockout (minutes): 30  1-Hour Limit (boluses/hr): 25  Basal Rate: 10        Assessment  Sensory level: T10 bilateral  Block outcome: pain improved  Number of attempts: 1  Events: no positive test dose  Procedure  assessment: patient tolerated procedure well with no immediate complications

## 2025-03-12 NOTE — PROGRESS NOTES
Intrapartum Progress Note    Assessment/Plan   Olga Lidia Sousa is a 29 y.o.  at 39w2d. ROXANNE: 3/17/2025, by Last Menstrual Period.     Starting to feel contractions  No cervical change    Await amniotomy after epidural, patient will let us know when wants to proceed    Assessment & Plan  Term pregnancy ([facility])    Pregnancy Problems (from 25 to present)       Problem Noted Diagnosed Resolved    Term pregnancy ([facility]) 3/12/2025 by Diane Hsu MD  No    Priority:  Medium               Subjective   Doing well    Objective   Last Vitals:  Temp Pulse Resp BP MAP Pulse Ox   36.3 °C (97.3 °F) 84 18 121/58 80 100 %     Vitals Min/Max Last 24 Hours:  Temp  Min: 36.3 °C (97.3 °F)  Max: 36.9 °C (98.4 °F)  Pulse  Min: 73  Max: 84  Resp  Min: 18  Max: 18  BP  Min: 116/75  Max: 121/58  MAP (mmHg)  Min: 80  Max: 91    Intake/Output:  No intake or output data in the 24 hours ending 25 1828    Physical Examination:  FHR is normal , with Accelerations, and a Cat 1  tracing.    [place] reading:    CERVIX: 2 cm dilated, 80 % effaced, -1 station; MEMBRANES are      Chaperone Present: Declined.  Chaperone Name/Title:   Examination Chaperoned:     Lab Review:

## 2025-03-12 NOTE — ANESTHESIA PREPROCEDURE EVALUATION
Patient: Olga Lidia Sousa    Evaluation Method: In-person visit    Procedure Information    Date: 03/12/25  Procedure: Labor Analgesia         Relevant Problems   Anesthesia (within normal limits)      Cardiac (within normal limits)      Pulmonary (within normal limits)      Neuro (within normal limits)      GI (within normal limits)      /Renal (within normal limits)      Liver (within normal limits)      Endocrine (within normal limits)      Hematology (within normal limits)      Musculoskeletal (within normal limits)      ID (within normal limits)      Skin (within normal limits)      GYN (within normal limits)       Clinical information reviewed:   Tobacco  Allergies    Med Hx  Surg Hx   Fam Hx          NPO Detail:  No data recorded     OB/Gyn Evaluation    Present Pregnancy    Patient is pregnant now.   Obstetric History            Physical Exam    Airway  Mallampati: II  Neck ROM: full     Cardiovascular - normal exam     Dental - normal exam     Pulmonary - normal exam     Abdominal        Anesthesia Plan    History of general anesthesia?: yes  History of complications of general anesthesia?: no    ASA 2     epidural     The patient is not a current smoker.  Patient was not previously instructed to abstain from smoking on day of procedure.  Patient did not smoke on day of procedure.    Anesthetic plan and risks discussed with patient.

## 2025-03-12 NOTE — H&P
OB Admission H&P    Assessment/Plan    Olga Lidia Sousa is a 29 y.o.  at 39w2d, ROXANNE: 3/17/2025, by Last Menstrual Period, who presents for Induction of Labor.    Clomid conception    Hyperemesis Gravidarum early pregnancy - phenergan/zofran    Low Lying Placenta - resolved by US    Plan  -Admit to L&D, consented  -T&S, CBC, and Syphilis  -Epidural at patient request  -Recheck as clinically indicated by maternal or fetal status  -Plan to initiate induction with Pitocin    Fetal Status  -NST reactive, reassuring   -Presentation VTX based on ultrasound and vaginal exam  -EFW 5'4oz/20.4% by US 2/15  -GBS negative    Postpartum  Contraception Plan:  to discuss    Pregnancy Problems (from 27 to present)       Problem Noted Diagnosed Resolved    Term pregnancy ([facility] 3/12/2025 by Diane Hsu MD  No    Priority:  Medium               Subjective   Good fetal movement.  Denies vaginal bleeding., Denies leaking of fluid.      Prenatal Provider [facility], [facility]    OB History    Para Term  AB Living   1 0 0 0 0 0   SAB IAB Ectopic Multiple Live Births   0 0 0 0 0      # Outcome Date GA Lbr Jerzy/2nd Weight Sex Type Anes PTL Lv   1 Current                Past Surgical History:   Procedure Laterality Date    OTHER SURGICAL HISTORY  2019    Foot surgery    OTHER SURGICAL HISTORY  2019    Bunionectomy       Social History     Tobacco Use    Smoking status: Never    Smokeless tobacco: Never   Substance Use Topics    Alcohol use: Never     Comment: once a month       No Known Allergies    Medications Prior to Admission   Medication Sig Dispense Refill Last Dose/Taking    omeprazole OTC (PriLOSEC OTC) 20 mg EC tablet Take 1 tablet (20 mg) by mouth once daily in the morning. Take before meals. Do not crush, chew, or split.       PRENATAL 2-IRON-FOLIC ACID-OM3 ORAL Take by mouth.        Objective     Last Vitals  Temp Pulse Resp BP MAP O2 Sat   36.9 °C (98.4 °F) 73 18 116/75 91 100 %     Blood  "Pressures         3/12/2025  1253             BP: 116/75             Physical Exam  General: NAD, mood appropriate  Cardiopulmonary: warm and well perfused, breathing comfortably on room air  Abdomen: Gravid, non-tender  Extremities: Symmetric  Speculum Exam: deferred  Cervix: 2 /80 /-1     Chaperone Present: Declined.  Chaperone Name/Title:   Examination Chaperoned:      Fetal Monitoring  Baseline: normal bpm, Variability: moderate,  Accelerations: present and Decelerations: none  Uterine Activity: Irregular contractions  Interpretation: Reactive    Bedside ultrasound: No    Labs in chart were reviewed.  No results found for: \"GRPBSTREP\"   Results from last 7 days   Lab Units 03/12/25  1345   WBC AUTO x10*3/uL 11.4*   HEMOGLOBIN g/dL 11.1*   HEMATOCRIT % 33.3*   PLATELETS AUTO x10*3/uL 284        Prenatal labs reviewed, not remarkable.        "

## 2025-03-13 LAB
ABO GROUP (TYPE) IN BLOOD: NORMAL
RH FACTOR (ANTIGEN D): NORMAL
TREPONEMA PALLIDUM IGG+IGM AB [PRESENCE] IN SERUM OR PLASMA BY IMMUNOASSAY: NONREACTIVE

## 2025-03-13 PROCEDURE — 2500000004 HC RX 250 GENERAL PHARMACY W/ HCPCS (ALT 636 FOR OP/ED)

## 2025-03-13 PROCEDURE — 2500000001 HC RX 250 WO HCPCS SELF ADMINISTERED DRUGS (ALT 637 FOR MEDICARE OP)

## 2025-03-13 PROCEDURE — 0DQR0ZZ REPAIR ANAL SPHINCTER, OPEN APPROACH: ICD-10-PCS

## 2025-03-13 PROCEDURE — 1220000001 HC OB SEMI-PRIVATE ROOM DAILY

## 2025-03-13 PROCEDURE — 2500000001 HC RX 250 WO HCPCS SELF ADMINISTERED DRUGS (ALT 637 FOR MEDICARE OP): Performed by: OBSTETRICS & GYNECOLOGY

## 2025-03-13 PROCEDURE — 7210000002 HC LABOR PER HOUR

## 2025-03-13 PROCEDURE — 51701 INSERT BLADDER CATHETER: CPT

## 2025-03-13 PROCEDURE — 0UQMXZZ REPAIR VULVA, EXTERNAL APPROACH: ICD-10-PCS

## 2025-03-13 PROCEDURE — 59409 OBSTETRICAL CARE: CPT

## 2025-03-13 PROCEDURE — 7100000016 HC LABOR RECOVERY PER HOUR

## 2025-03-13 PROCEDURE — 2500000005 HC RX 250 GENERAL PHARMACY W/O HCPCS

## 2025-03-13 RX ORDER — ACETAMINOPHEN 325 MG/1
975 TABLET ORAL EVERY 6 HOURS SCHEDULED
Status: DISCONTINUED | OUTPATIENT
Start: 2025-03-13 | End: 2025-03-15 | Stop reason: HOSPADM

## 2025-03-13 RX ORDER — DOCUSATE SODIUM 100 MG/1
100 CAPSULE, LIQUID FILLED ORAL 2 TIMES DAILY
Status: DISCONTINUED | OUTPATIENT
Start: 2025-03-13 | End: 2025-03-15 | Stop reason: HOSPADM

## 2025-03-13 RX ORDER — ONDANSETRON 4 MG/1
4 TABLET, FILM COATED ORAL EVERY 6 HOURS PRN
Status: DISCONTINUED | OUTPATIENT
Start: 2025-03-13 | End: 2025-03-15 | Stop reason: HOSPADM

## 2025-03-13 RX ORDER — METHYLERGONOVINE MALEATE 0.2 MG/ML
0.2 INJECTION INTRAVENOUS ONCE AS NEEDED
Status: DISCONTINUED | OUTPATIENT
Start: 2025-03-13 | End: 2025-03-15 | Stop reason: HOSPADM

## 2025-03-13 RX ORDER — MISOPROSTOL 200 UG/1
800 TABLET ORAL ONCE AS NEEDED
Status: DISCONTINUED | OUTPATIENT
Start: 2025-03-13 | End: 2025-03-15 | Stop reason: HOSPADM

## 2025-03-13 RX ORDER — OXYTOCIN 10 [USP'U]/ML
10 INJECTION, SOLUTION INTRAMUSCULAR; INTRAVENOUS ONCE AS NEEDED
Status: DISCONTINUED | OUTPATIENT
Start: 2025-03-13 | End: 2025-03-15 | Stop reason: HOSPADM

## 2025-03-13 RX ORDER — DIBUCAINE 1 %
OINTMENT (GRAM) TOPICAL 4 TIMES DAILY
Status: DISCONTINUED | OUTPATIENT
Start: 2025-03-13 | End: 2025-03-15 | Stop reason: HOSPADM

## 2025-03-13 RX ORDER — OXYCODONE HYDROCHLORIDE 5 MG/1
5 TABLET ORAL EVERY 6 HOURS PRN
Status: DISCONTINUED | OUTPATIENT
Start: 2025-03-13 | End: 2025-03-15 | Stop reason: HOSPADM

## 2025-03-13 RX ORDER — CARBOPROST TROMETHAMINE 250 UG/ML
250 INJECTION, SOLUTION INTRAMUSCULAR ONCE AS NEEDED
Status: DISCONTINUED | OUTPATIENT
Start: 2025-03-13 | End: 2025-03-15 | Stop reason: HOSPADM

## 2025-03-13 RX ORDER — HYDRALAZINE HYDROCHLORIDE 20 MG/ML
5 INJECTION INTRAMUSCULAR; INTRAVENOUS ONCE AS NEEDED
Status: DISCONTINUED | OUTPATIENT
Start: 2025-03-13 | End: 2025-03-15 | Stop reason: HOSPADM

## 2025-03-13 RX ORDER — SIMETHICONE 80 MG
80 TABLET,CHEWABLE ORAL 4 TIMES DAILY PRN
Status: DISCONTINUED | OUTPATIENT
Start: 2025-03-13 | End: 2025-03-15 | Stop reason: HOSPADM

## 2025-03-13 RX ORDER — LABETALOL HYDROCHLORIDE 5 MG/ML
20 INJECTION, SOLUTION INTRAVENOUS ONCE AS NEEDED
Status: DISCONTINUED | OUTPATIENT
Start: 2025-03-13 | End: 2025-03-15 | Stop reason: HOSPADM

## 2025-03-13 RX ORDER — LOPERAMIDE HYDROCHLORIDE 2 MG/1
4 CAPSULE ORAL EVERY 2 HOUR PRN
Status: DISCONTINUED | OUTPATIENT
Start: 2025-03-13 | End: 2025-03-15 | Stop reason: HOSPADM

## 2025-03-13 RX ORDER — NIFEDIPINE 10 MG/1
10 CAPSULE ORAL ONCE AS NEEDED
Status: DISCONTINUED | OUTPATIENT
Start: 2025-03-13 | End: 2025-03-15 | Stop reason: HOSPADM

## 2025-03-13 RX ORDER — TRANEXAMIC ACID 10 MG/ML
1000 INJECTION, SOLUTION INTRAVENOUS ONCE AS NEEDED
Status: ACTIVE | OUTPATIENT
Start: 2025-03-13 | End: 2025-03-13

## 2025-03-13 RX ORDER — IBUPROFEN 600 MG/1
600 TABLET ORAL EVERY 6 HOURS SCHEDULED
Status: DISCONTINUED | OUTPATIENT
Start: 2025-03-13 | End: 2025-03-15 | Stop reason: HOSPADM

## 2025-03-13 RX ORDER — ADHESIVE BANDAGE
10 BANDAGE TOPICAL
Status: DISCONTINUED | OUTPATIENT
Start: 2025-03-13 | End: 2025-03-15 | Stop reason: HOSPADM

## 2025-03-13 RX ORDER — ONDANSETRON HYDROCHLORIDE 2 MG/ML
4 INJECTION, SOLUTION INTRAVENOUS EVERY 6 HOURS PRN
Status: DISCONTINUED | OUTPATIENT
Start: 2025-03-13 | End: 2025-03-15 | Stop reason: HOSPADM

## 2025-03-13 RX ORDER — BISACODYL 10 MG/1
10 SUPPOSITORY RECTAL DAILY PRN
Status: DISCONTINUED | OUTPATIENT
Start: 2025-03-13 | End: 2025-03-15 | Stop reason: HOSPADM

## 2025-03-13 RX ADMIN — BENZOCAINE AND LEVOMENTHOL 1 APPLICATION: 200; 5 SPRAY TOPICAL at 03:04

## 2025-03-13 RX ADMIN — DIBUCAINE: 10 OINTMENT TOPICAL at 08:24

## 2025-03-13 RX ADMIN — Medication 60 MILLI-UNITS/MIN: at 01:51

## 2025-03-13 RX ADMIN — OXYCODONE HYDROCHLORIDE 5 MG: 5 TABLET ORAL at 20:30

## 2025-03-13 RX ADMIN — ACETAMINOPHEN 975 MG: 325 TABLET, FILM COATED ORAL at 08:24

## 2025-03-13 RX ADMIN — IBUPROFEN 600 MG: 600 TABLET, FILM COATED ORAL at 08:25

## 2025-03-13 RX ADMIN — ONDANSETRON 4 MG: 2 INJECTION, SOLUTION INTRAMUSCULAR; INTRAVENOUS at 00:03

## 2025-03-13 RX ADMIN — ACETAMINOPHEN 975 MG: 325 TABLET, FILM COATED ORAL at 02:37

## 2025-03-13 RX ADMIN — IBUPROFEN 600 MG: 600 TABLET, FILM COATED ORAL at 20:30

## 2025-03-13 RX ADMIN — ACETAMINOPHEN 975 MG: 325 TABLET, FILM COATED ORAL at 14:36

## 2025-03-13 RX ADMIN — IBUPROFEN 600 MG: 600 TABLET, FILM COATED ORAL at 14:36

## 2025-03-13 RX ADMIN — DOCUSATE SODIUM 100 MG: 100 CAPSULE, LIQUID FILLED ORAL at 02:38

## 2025-03-13 RX ADMIN — ACETAMINOPHEN 975 MG: 325 TABLET, FILM COATED ORAL at 20:30

## 2025-03-13 RX ADMIN — IBUPROFEN 600 MG: 600 TABLET, FILM COATED ORAL at 02:38

## 2025-03-13 RX ADMIN — DOCUSATE SODIUM 100 MG: 100 CAPSULE, LIQUID FILLED ORAL at 20:30

## 2025-03-13 ASSESSMENT — PAIN SCALES - GENERAL
PAINLEVEL_OUTOF10: 0 - NO PAIN
PAINLEVEL_OUTOF10: 8
PAINLEVEL_OUTOF10: 0 - NO PAIN
PAINLEVEL_OUTOF10: 8
PAINLEVEL_OUTOF10: 8
PAINLEVEL_OUTOF10: 0 - NO PAIN
PAINLEVEL_OUTOF10: 8
PAINLEVEL_OUTOF10: 0 - NO PAIN
PAINLEVEL_OUTOF10: 8
PAINLEVEL_OUTOF10: 0 - NO PAIN

## 2025-03-13 ASSESSMENT — PAIN DESCRIPTION - LOCATION
LOCATION: PERINEUM
LOCATION: PERINEUM

## 2025-03-13 ASSESSMENT — PAIN DESCRIPTION - DESCRIPTORS
DESCRIPTORS: ACHING
DESCRIPTORS: ACHING
DESCRIPTORS: ACHING;TENDER
DESCRIPTORS: ACHING

## 2025-03-13 NOTE — ANESTHESIA POSTPROCEDURE EVALUATION
Patient: Olga Lidia Sousa    Procedure Summary       Date: 03/12/25 Room / Location:     Anesthesia Start: 1907 Anesthesia Stop: 03/13/25 0120    Procedure: Labor Analgesia Diagnosis:     Scheduled Providers:  Responsible Provider: RUBEN Briseno    Anesthesia Type: epidural ASA Status: 2            Anesthesia Type: epidural    Vitals:    03/13/25 0522   BP: 108/62   Pulse: 65   Resp:    Temp:    SpO2:          Anesthesia Post Evaluation    Patient location during evaluation: bedside  Patient participation: complete - patient participated  Level of consciousness: awake and alert  Pain management: adequate  Airway patency: patent  Cardiovascular status: acceptable  Respiratory status: acceptable  Hydration status: acceptable  Postoperative Nausea and Vomiting: none      No notable events documented.

## 2025-03-13 NOTE — L&D DELIVERY NOTE
OB Delivery Note  3/13/2025  Olga Lidia Kolbthiago  29 y.o.   Vaginal, Spontaneous     Uncomplicated progression to complete.     viable female infant over partial 3rd degree tear.  Spontaneous cry at delivery.    Placenta expressed delivered 3VC, Intact.    Rectal done - intact. Partial 3rd degree tear off to left - repaired in the usual fashion with 2-0 and 3-0 vicryl suture.    Left periurethral tear repaired with 3-0 Vicryl.    EBL 150cc  Complications - none    Gestational Age: 39w3d  /Para:   Quantitative Blood Loss: Admission to Discharge: 150 mL (3/12/2025 12:04 PM - 3/13/2025  2:26 AM)    Ameena Sousa [[id number]]      Labor Events    Sac identifier: Sac 1  Rupture date/time: 3/12/2025 2000  Rupture type: Artificial  Fluid color: Clear  Fluid odor: None  Labor type: Induced Onset of Labor  Labor allowed to proceed with plans for an attempted vaginal birth?: Yes  Induction: Oxytocin, AROM  Induction indications: Risk Reducing  Complications: None       Labor Event Times    Labor onset date/time: 3/12/2025 1400  Dilation complete date/time: 3/13/2025 0000  Start pushing date/time: 3/13/2025 0038       Placenta    Placenta delivery date/time: 3/13/2025 0123  Placenta removal: Expressed  Placenta appearance: Intact  Placenta disposition: discarded       Cord    Vessels: 3 vessels  Complications: None  Delayed cord clamping?: Yes  Cord blood disposition: Lab  Gases sent?: No  Stem cell collection (by provider): No       Lacerations    Episiotomy: None  Perineal laceration: 3rd  Perineal laceration repaired?: Yes  Periurethral laceration?: Yes  Periurethral laceration location: left  Periurethral laceration repaired?: Yes  Repair suture: 2-0 Synthetic Suture, 3-0 Synthetic Suture       Anesthesia    Method: Epidural       Operative Delivery    Forceps attempted?: No  Vacuum extractor attempted?: No       Shoulder Dystocia    Shoulder dystocia present?: No       [place] Delivery    Time head delivered:  3/13/2025 00:18:00  Birth date/time: 3[birth year]13/2025 01:20:00  Delivery type: Vaginal, Spontaneous  Complications: None       Resuscitation    Method: Suctioning, Tactile stimulation       Apgars    Living status:   Apgar Component Scores:  1 min.:  5 min.:  10 min.:  15 min.:  20 min.:    Skin color:  1  1       Heart rate:  2  2       Reflex irritability:  2  2       Muscle tone:  2  2       Respiratory effort:  2  2       Total:  9  9       Apgars assigned by: NARAYAN THOMAS RN       Delivery Providers    Delivering clinician: Diane Hsu MD   Provider Role    Steff Vazquez RN Delivery Nurse    Genesis Thomas RN Nursery Nurse     Resident                     Diane Hsu MD

## 2025-03-13 NOTE — LACTATION NOTE
Lactation Consultant Note  Lactation Consultation  Reason for Consult: Follow-up assessment  Consultant Name: Lauren Bates RN    Maternal Information       Maternal Assessment  Breast Assessment: Soft, Compressible  Nipple Assessment: Intact, Short, Erect with stimulation    Infant Assessment  Infant Behavior: Active alert, Readiness to feed, Feeding cues observed, Rooting response    Feeding Assessment  Nutrition Source: Breastmilk  Feeding Method: Nursing at the breast  Feeding Position: Cross - cradle, Mother demonstrates good positioning, Cradle  Suck/Feeding: Sustained, Coordinated suck/swallow/breathe, Baby led rhythmically  Latch Assessment: Mouth not open wide enough, Flanged lips, Pain during feeding, Areolar attachment, Minimal assistance is needed, Instructed on deep latch    LATCH TOOL  Latch: Repeated attempts, hold nipple in mouth, stimulate to suck  Audible Swallowing: A few with stimulation  Type of Nipple: Everted (After stimulation)  Comfort (Breast/Nipple): Soft/non-tender  Hold (Positioning): Minimal assist, teach one side, mother does other, staff holds  LATCH Score: 7    Breast Pump       Other OB Lactation Tools       Patient Follow-up  Inpatient Lactation Follow-up Needed : Yes  Outpatient Lactation Follow-up: Recommended    Other OB Lactation Documentation  Maternal Risk Factors: Polycystic ovary syndrome    Recommendations/Summary  Met with mother. Infant showing feeding cues in crib. Mother tried putting infant to breast. Infant very tired at first and would only latch shallow. Infant eventually opened wider for mother to get a deeper latch. Mother having some soreness. Suggested that if it continues trying another position. Continuing support offered as needed.

## 2025-03-13 NOTE — PROGRESS NOTES
Intrapartum Progress Note    Assessment/Plan   Olga Lidia Sousa is a 29 y.o.  at 39w2d. ROXANNE: 3/17/2025, by Last Menstrual Period.     Comfortable with Epidural  Amniotomy done - no complications  Continue pitocin per protocol    Assessment & Plan  Term pregnancy ([facility])    Pregnancy Problems (from 25 to present)       Problem Noted Diagnosed Resolved    Term pregnancy ([facility]) 3/12/2025 by Diane Hsu MD  No    Priority:  Medium               Subjective   comfortable    Objective   Last Vitals:  Temp Pulse Resp BP MAP Pulse Ox   36.6 °C (97.9 °F) 67 18 106/60 78 98 %     Vitals Min/Max Last 24 Hours:  Temp  Min: 36.3 °C (97.3 °F)  Max: 37.2 °C (99 °F)  Pulse  Min: 67  Max: 97  Resp  Min: 18  Max: 18  BP  Min: 105/57  Max: 121/58  MAP (mmHg)  Min: 74  Max: 91    Intake/Output:  No intake or output data in the 24 hours ending 25    Physical Examination:  FHR is normal , with Accelerations, and a   tracing.    [place] readin-3min   CERVIX: 2 cm dilated, 80 % effaced, -1 station; MEMBRANES are Intact    Chaperone Present: Declined.  Chaperone Name/Title:   Examination Chaperoned:     Lab Review:

## 2025-03-13 NOTE — CARE PLAN
The patient's goals for the shift include deliver baby    The clinical goals for the shift include deliver baby      Problem: Vaginal Birth or  Section  Goal: Fetal and maternal status remain reassuring during the birth process  Outcome: Progressing  Goal: Prevention of malpresentation/labor dystocia through delivery  Outcome: Progressing  Goal: Demonstrates labor coping techniques through delivery  Outcome: Progressing  Goal: Minimal s/sx of HDP and BP<160/110  Outcome: Progressing  Goal: No s/sx of infection through recovery  Outcome: Progressing  Goal: No s/sx of hemorrhage through recovery  Outcome: Progressing     Problem: Postpartum  Goal: Experiences normal postpartum course  Outcome: Progressing  Goal: Appropriate maternal -  bonding  Outcome: Progressing  Goal: Establish and maintain infant feeding pattern for adequate nutrition  Outcome: Progressing  Goal: Incisions, wounds, or drain sites healing without S/S of infection  Outcome: Progressing  Goal: No s/sx infection  Outcome: Progressing  Goal: No s/sx of hemorrhage  Outcome: Progressing  Goal: Minimal s/sx of HDP and BP<160/110  Outcome: Progressing     Problem: Postpartum hemorrhage  Goal: Hemodynamic stability and limit blood loss  Outcome: Progressing     Problem: Pain - Adult  Goal: Verbalizes/displays adequate comfort level or baseline comfort level  Outcome: Progressing     Problem: Safety - Adult  Goal: Free from fall injury  Outcome: Progressing

## 2025-03-13 NOTE — LACTATION NOTE
Lactation Consultant Note  Lactation Consultation  Reason for Consult: Initial assessment  Consultant Name: Lauren Bates RN    Maternal Information  Has mother  before?: No  Infant to breast within first 2 hours of birth?: Yes  Exclusive Pump and Bottle Feed: No    Maternal Assessment  Breast Assessment: Soft, Compressible  Nipple Assessment: Intact, Short, Erect with stimulation  Areola Assessment: Normal    Infant Assessment  Infant Behavior: Sleepy    Feeding Assessment  Nutrition Source: Breastmilk  Latch Assessment: No latch achieved, Too sleepy    LATCH TOOL       Breast Pump       Other OB Lactation Tools       Patient Follow-up  Inpatient Lactation Follow-up Needed : Yes  Outpatient Lactation Follow-up: Recommended    Other OB Lactation Documentation  Maternal Risk Factors: Polycystic ovary syndrome    Recommendations/Summary  Met with mother. Mother is a first time breastfeeder. Mother said infant was feeding often last night but now is very sleepy. Mother said she is having some nipple soreness. Tried to get infant to wake for feed but infant would not wake. Mother was able to hand express milk a few times while trying to get infant to latch. Reviewed hand expression and encouraged mother to hand express when infant will not latch. Reviewed skin to skin and the benefits, feeding cues, how long to feed, how often to feed and the appropriate amount diapers. Mother is going to call if infant wants to latch. Continuing support offered as needed.

## 2025-03-14 PROCEDURE — 1220000001 HC OB SEMI-PRIVATE ROOM DAILY

## 2025-03-14 PROCEDURE — 2500000001 HC RX 250 WO HCPCS SELF ADMINISTERED DRUGS (ALT 637 FOR MEDICARE OP)

## 2025-03-14 PROCEDURE — 2500000001 HC RX 250 WO HCPCS SELF ADMINISTERED DRUGS (ALT 637 FOR MEDICARE OP): Performed by: OBSTETRICS & GYNECOLOGY

## 2025-03-14 RX ADMIN — IBUPROFEN 600 MG: 600 TABLET, FILM COATED ORAL at 08:52

## 2025-03-14 RX ADMIN — ACETAMINOPHEN 975 MG: 325 TABLET, FILM COATED ORAL at 15:55

## 2025-03-14 RX ADMIN — ACETAMINOPHEN 975 MG: 325 TABLET, FILM COATED ORAL at 03:13

## 2025-03-14 RX ADMIN — OXYCODONE HYDROCHLORIDE 5 MG: 5 TABLET ORAL at 08:52

## 2025-03-14 RX ADMIN — DOCUSATE SODIUM 100 MG: 100 CAPSULE, LIQUID FILLED ORAL at 08:52

## 2025-03-14 RX ADMIN — OXYCODONE HYDROCHLORIDE 5 MG: 5 TABLET ORAL at 21:58

## 2025-03-14 RX ADMIN — IBUPROFEN 600 MG: 600 TABLET, FILM COATED ORAL at 03:13

## 2025-03-14 RX ADMIN — DIBUCAINE: 10 OINTMENT TOPICAL at 21:57

## 2025-03-14 RX ADMIN — IBUPROFEN 600 MG: 600 TABLET, FILM COATED ORAL at 15:55

## 2025-03-14 RX ADMIN — OXYCODONE HYDROCHLORIDE 5 MG: 5 TABLET ORAL at 03:13

## 2025-03-14 RX ADMIN — ACETAMINOPHEN 975 MG: 325 TABLET, FILM COATED ORAL at 21:57

## 2025-03-14 RX ADMIN — ACETAMINOPHEN 975 MG: 325 TABLET, FILM COATED ORAL at 08:52

## 2025-03-14 RX ADMIN — IBUPROFEN 600 MG: 600 TABLET, FILM COATED ORAL at 21:58

## 2025-03-14 RX ADMIN — DOCUSATE SODIUM 100 MG: 100 CAPSULE, LIQUID FILLED ORAL at 21:58

## 2025-03-14 ASSESSMENT — PAIN SCALES - GENERAL
PAINLEVEL_OUTOF10: 7
PAINLEVEL_OUTOF10: 2
PAINLEVEL_OUTOF10: 5 - MODERATE PAIN
PAINLEVEL_OUTOF10: 0 - NO PAIN
PAINLEVEL_OUTOF10: 0 - NO PAIN

## 2025-03-14 ASSESSMENT — PAIN DESCRIPTION - LOCATION
LOCATION: PERINEUM
LOCATION: PERINEUM

## 2025-03-14 NOTE — LACTATION NOTE
Lactation Consultant Note  Lactation Consultation  Reason for Consult: Follow-up assessment    Maternal Information       Maternal Assessment  Breast Assessment: Soft, Compressible  Nipple Assessment: Intact, Creased after feeding, Erect with stimulation  Areola Assessment: Normal    Infant Assessment  Infant Behavior: Awake, Active alert, Readiness to feed, Feeding cues observed, Rooting response    Feeding Assessment  Nutrition Source: Breastmilk  Feeding Method: Nursing at the breast  Feeding Position: Baby led, Cross - cradle, Laid back, Mother demonstrates good positioning  Suck/Feeding: Sustained, Coordinated suck/swallow/breathe, Baby led rhythmically, Audible swallowing with stimulaton  Latch Assessment: Eagerly grasped on to latch, Flanged lips, Latch achieved, Areolar attachment, Bursts of sucking, swallowing, and rest, Mouth not open wide enough, Pain during feeding    LATCH TOOL  Latch: Grasps breast, tongue down, lips flanged, rhythmic sucking  Audible Swallowing: A few with stimulation  Type of Nipple: Everted (After stimulation)  Comfort (Breast/Nipple): Soft/non-tender  Hold (Positioning): No assist from staff, mother able to position/hold infant  LATCH Score: 9    Breast Pump       Other OB Lactation Tools       Patient Follow-up  Inpatient Lactation Follow-up Needed : Yes  Outpatient Lactation Follow-up: Recommended    Other OB Lactation Documentation  Maternal Risk Factors: Polycystic ovary syndrome    Recommendations/Summary  Met with mother. Mother breastfeeding as I walked in. Mother stated it is still painful. Latch looked good. Peds said there is a tongue tie. Discussed the tongue tie sometimes causing pain. Suggested switching to laid back position to see if that helps. Mother stated it was still about the same but tolerable. Gave mother information to follow up and get the tongue tie looked at. Discussed shorter more frequent feeds to help with the nipple pain as well as also switching feed  positions up each feed. Mother has Lanolin , gel pads and stated she has silverets at home. Discussed possibly pumping for a session or two if mother needs a nipple break. Reviewed lactation handouts and resources. Reviewed engorgement, masitis, milk storage, pumping and cleaning. Continuing support offered as needed.

## 2025-03-14 NOTE — LACTATION NOTE
Lactation Consultant Note      Met with mother. Mother stated infant was cluster feeding last night and mother is sore. Mother has lanolin and I gave her some gel pads. Suggested switching positions. Mother is going to call for next feed so that I can assess latch. Continuing support offered as needed.

## 2025-03-14 NOTE — CARE PLAN
Problem: Postpartum  Goal: Experiences normal postpartum course  3/13/2025 2127 by Heidi Urena RN  Outcome: Progressing  3/13/2025 2024 by Heidi Urena RN  Outcome: Progressing  Goal: Appropriate maternal -  bonding  3/13/2025 2127 by Heidi Urena RN  Outcome: Progressing  3/13/2025 2024 by Heidi Urena RN  Outcome: Progressing  Goal: Establish and maintain infant feeding pattern for adequate nutrition  3/13/2025 2127 by Heidi Urena RN  Outcome: Progressing  3/13/2025 2024 by Heidi Urena RN  Outcome: Progressing  Goal: Incisions, wounds, or drain sites healing without S/S of infection  3/13/2025 2127 by Heidi Urena RN  Outcome: Progressing  3/13/2025 2024 by Heidi Urena RN  Outcome: Progressing  Goal: No s/sx infection  3/13/2025 2127 by Heidi Urena RN  Outcome: Progressing  3/13/2025 2024 by Heidi Urena RN  Outcome: Progressing  Goal: No s/sx of hemorrhage  3/13/2025 2127 by Heidi Urena RN  Outcome: Progressing  3/13/2025 2024 by Heidi Urena RN  Outcome: Progressing  Goal: Minimal s/sx of HDP and BP<160/110  3/13/2025 2127 by Heidi Urena RN  Outcome: Progressing  3/13/2025 2024 by Heidi Urena RN  Outcome: Progressing     Problem: Postpartum hemorrhage  Goal: Hemodynamic stability and limit blood loss  3/13/2025 2127 by Heidi Urena RN  Outcome: Progressing  3/13/2025 2024 by Heidi Urena RN  Outcome: Progressing     Problem: Pain - Adult  Goal: Verbalizes/displays adequate comfort level or baseline comfort level  3/13/2025 2127 by Heidi Urena RN  Outcome: Progressing  3/13/2025 2024 by Heidi Urena RN  Outcome: Progressing     Problem: Safety - Adult  Goal: Free from fall injury  3/13/2025 2127 by Heidi Urena RN  Outcome: Progressing  3/13/2025 2024 by Heidi Urena, RN  Outcome: Progressing

## 2025-03-14 NOTE — PROGRESS NOTES
Postpartum Progress Note    Assessment/Plan   Olga Lidia Sousa is a 29 y.o., , who delivered at 39w3d gestation and is now postpartum day 1 s/p , doing well.    Routine postpartum care    Assessment & Plan  Term pregnancy ([facility])    Pregnancy Problems (from 25 to present)       Problem Noted Diagnosed Resolved    Term pregnancy ([facility]) 3/12/2025 by Diane Hsu MD  No    Priority:  Medium             Hospital course: no complications  Vaginal Birth  Patient is currently breastfeedingThe patient's blood type is O POS. The baby's blood type is A POS. Rhogam is not indicated.    Subjective   Feeling well, light lochia, voiding without difficulty, mild cramping    Objective   Allergies:   Patient has no known allergies.         Last Vitals:  Temp Pulse Resp BP MAP Pulse Ox   36.8 °C (98.2 °F) 65 20 101/59 74 96 %     Vitals Min/Max Last 24 Hours:  Temp  Min: 36.7 °C (98.1 °F)  Max: 37 °C (98.6 °F)  Pulse  Min: 59  Max: 71  Resp  Min: 16  Max: 20  BP  Min: 90/51  Max: 117/71  MAP (mmHg)  Min: 65  Max: 88    Intake/Output:     Intake/Output Summary (Last 24 hours) at 3/14/2025 0721  Last data filed at 3/13/2025 0829  Gross per 24 hour   Intake --   Output 250 ml   Net -250 ml       Physical Exam:  GEN: Well appearing, Alert and oriented  HEENT: normocephalic, atraumatic  Abd: soft, NT, ND, fundus firm and nontender  Ext: No edema, nontender

## 2025-03-15 VITALS
TEMPERATURE: 98.4 F | HEIGHT: 67 IN | DIASTOLIC BLOOD PRESSURE: 55 MMHG | WEIGHT: 159 LBS | BODY MASS INDEX: 24.96 KG/M2 | OXYGEN SATURATION: 98 % | SYSTOLIC BLOOD PRESSURE: 101 MMHG | HEART RATE: 63 BPM | RESPIRATION RATE: 18 BRPM

## 2025-03-15 PROCEDURE — 2500000001 HC RX 250 WO HCPCS SELF ADMINISTERED DRUGS (ALT 637 FOR MEDICARE OP)

## 2025-03-15 RX ORDER — ACETAMINOPHEN 325 MG/1
975 TABLET ORAL EVERY 6 HOURS SCHEDULED
Qty: 20 TABLET | Refills: 0 | Status: SHIPPED | OUTPATIENT
Start: 2025-03-15

## 2025-03-15 RX ORDER — IBUPROFEN 600 MG/1
600 TABLET ORAL EVERY 6 HOURS SCHEDULED
Qty: 30 TABLET | Refills: 0 | Status: SHIPPED | OUTPATIENT
Start: 2025-03-15

## 2025-03-15 RX ORDER — DOCUSATE SODIUM 100 MG/1
100 CAPSULE, LIQUID FILLED ORAL 2 TIMES DAILY
Qty: 20 CAPSULE | Refills: 0 | Status: SHIPPED | OUTPATIENT
Start: 2025-03-15

## 2025-03-15 RX ADMIN — IBUPROFEN 600 MG: 600 TABLET, FILM COATED ORAL at 04:01

## 2025-03-15 RX ADMIN — IBUPROFEN 600 MG: 600 TABLET, FILM COATED ORAL at 10:18

## 2025-03-15 RX ADMIN — ACETAMINOPHEN 975 MG: 325 TABLET, FILM COATED ORAL at 04:01

## 2025-03-15 RX ADMIN — ACETAMINOPHEN 975 MG: 325 TABLET, FILM COATED ORAL at 10:18

## 2025-03-15 ASSESSMENT — PAIN SCALES - GENERAL
PAINLEVEL_OUTOF10: 0 - NO PAIN
PAINLEVEL_OUTOF10: 4
PAINLEVEL_OUTOF10: 6
PAINLEVEL_OUTOF10: 2

## 2025-03-15 NOTE — CARE PLAN
The patient's goals for the shift include pain relief    The clinical goals for the shift include pain relief    Problem: Postpartum  Goal: Experiences normal postpartum course  Outcome: Progressing  Goal: Appropriate maternal -  bonding  Outcome: Progressing  Goal: Establish and maintain infant feeding pattern for adequate nutrition  Outcome: Progressing  Goal: Incisions, wounds, or drain sites healing without S/S of infection  Outcome: Progressing  Goal: No s/sx infection  Outcome: Progressing  Goal: No s/sx of hemorrhage  Outcome: Progressing  Goal: Minimal s/sx of HDP and BP<160/110  Outcome: Progressing

## 2025-03-15 NOTE — DISCHARGE SUMMARY
Discharge Summary    Admission Date: 3/12/2025  Discharge Date: 03/15/25      Discharge Diagnosis  Term pregnancy ([facility])    Hospital Course  Delivery Date: 3/13/2025 1:20 AM  Delivery type: Vaginal, Spontaneous   GA at delivery: 39w3d  Outcome: Living  Anesthesia during delivery: Epidural  Intrapartum complications: None  Feeding method: Breastfeeding Status: Yes     Procedures: none  Contraception at discharge: undecided      Pertinent Physical Exam At Time of Discharge    General: Examination reveals a well developed, well nourished, female, in no acute distress. She is alert and cooperative.  Fundus: firm.  Extremities: no redness or tenderness in the calves or thighs, no edema.    Last Vitals:  Temp Pulse Resp BP MAP Pulse Ox   36.9 °C (98.4 °F) 63 18 101/55 74 98 %     Discharge Meds     Your medication list        START taking these medications        Instructions Last Dose Given Next Dose Due   acetaminophen 325 mg tablet  Commonly known as: Tylenol      Take 3 tablets (975 mg) by mouth every 6 hours.       docusate sodium 100 mg capsule  Commonly known as: Colace      Take 1 capsule (100 mg) by mouth 2 times a day.       ibuprofen 600 mg tablet      Take 1 tablet (600 mg) by mouth every 6 hours.              CONTINUE taking these medications        Instructions Last Dose Given Next Dose Due   omeprazole OTC 20 mg EC tablet  Commonly known as: PriLOSEC OTC           PRENATAL 2-IRON-FOLIC ACID-OM3 ORAL                     Where to Get Your Medications        These medications were sent to [id number] - [place], [place] - [address] AT [facility] & [address] [place], [place] [address]      Phone: [phone]   acetaminophen 325 mg tablet  docusate sodium 100 mg capsule  ibuprofen 600 mg tablet          Complications Requiring Follow-Up  6 weeks postpartum visit        Outpatient Follow-Up  Future Appointments   Date Time Provider Department Center   3/5/2026 10:00 AM  Pam Lee DO [id number]       I spent 20 minutes in the professional and overall care of this patient.      Anali Amador MD

## 2025-03-15 NOTE — CARE PLAN
The patient's goals for the shift include discharge home    The clinical goals for the shift include discharge home

## 2025-03-15 NOTE — PROGRESS NOTES
Postpartum Progress Note    Assessment/Plan   Olga Lidia Sousa is a 29 y.o., , who delivered at 39w3d gestation and is now postpartum day 2.    Postpartum care:  PPD 2 s/p . VSS, recovering well  -Routine Postpartum care  -PRN PO pain control  -Encourage out of bed, deep breathing, PO intake  -OK for discharge home today      Assessment & Plan  Term pregnancy ([facility])    Pregnancy Problems (from 25 to present)       Problem Noted Diagnosed Resolved    Term pregnancy ([facility]) 3/12/2025 by Diane Hsu MD  No    Priority:  Medium             Hospital course: no complications  Vaginal Birth  Patient is currently breastfeedingThe patient's blood type is O POS. The baby's blood type is A POS. Rhogam is not indicated.    Subjective   Her pain is well controlled with current medications  She is passing flatus  She is ambulating well  She is tolerating a Adult diet Regular  She reports no breast or nursing problems  She denies emotional concerns today   Her plan for contraception is undecided     Pt recovering well after vaginal delivery. Urinating, ambulating, tolerating PO. Vaginal bleeding less than menses. Denies Headache, Chest pain, SOB, nausea, vomiting, RUQ pain, or dizziness.      Objective   Allergies:   Patient has no known allergies.         Last Vitals:  Temp Pulse Resp BP MAP Pulse Ox   36.9 °C (98.4 °F) 63 18 101/55 74 98 %     Vitals Min/Max Last 24 Hours:  Temp  Min: 36.4 °C (97.5 °F)  Max: 37 °C (98.6 °F)  Pulse  Min: 63  Max: 84  Resp  Min: 16  Max: 18  BP  Min: 101/55  Max: 120/74  MAP (mmHg)  Min: 74  Max: 93    Intake/Output:   No intake or output data in the 24 hours ending 03/15/25 0934    Physical Exam:  General: Examination reveals a well developed, well nourished, female, in no acute distress. She is alert and cooperative.  Fundus: firm.  Perineum: well approximated.  Extremities: no redness or tenderness in the calves or thighs, no edema.      Lab Data:  Lab Results    Component Value Date    WBC 11.4 (H) 03/12/2025    HGB 11.1 (L) 03/12/2025    HCT 33.3 (L) 03/12/2025     03/12/2025

## 2025-03-29 ENCOUNTER — OFFICE VISIT (OUTPATIENT)
Dept: URGENT CARE | Age: 30
End: 2025-03-29
Payer: COMMERCIAL

## 2025-03-29 VITALS
TEMPERATURE: 98.3 F | BODY MASS INDEX: 21.66 KG/M2 | OXYGEN SATURATION: 99 % | SYSTOLIC BLOOD PRESSURE: 113 MMHG | RESPIRATION RATE: 18 BRPM | WEIGHT: 138 LBS | HEART RATE: 88 BPM | DIASTOLIC BLOOD PRESSURE: 65 MMHG | HEIGHT: 67 IN

## 2025-03-29 DIAGNOSIS — N61.0 MASTITIS: Primary | ICD-10-CM

## 2025-03-29 PROCEDURE — 3008F BODY MASS INDEX DOCD: CPT | Performed by: NURSE PRACTITIONER

## 2025-03-29 PROCEDURE — 99213 OFFICE O/P EST LOW 20 MIN: CPT | Performed by: NURSE PRACTITIONER

## 2025-03-29 PROCEDURE — 1036F TOBACCO NON-USER: CPT | Performed by: NURSE PRACTITIONER

## 2025-03-29 RX ORDER — DICLOXACILLIN SODIUM 500 MG/1
500 CAPSULE ORAL 4 TIMES DAILY
Qty: 40 CAPSULE | Refills: 0 | Status: SHIPPED | OUTPATIENT
Start: 2025-03-29 | End: 2025-04-08

## 2025-03-29 ASSESSMENT — ENCOUNTER SYMPTOMS: COLOR CHANGE: 1

## 2025-03-29 NOTE — PROGRESS NOTES
"Subjective   Patient ID: Olga Lidia Sousa is a 29 y.o. female. They present today with a chief complaint of Mastitis (Patient states that she may possibly have mastitis x 1 day./).    History of Present Illness  Painful left breast with redness and warmth   Denies fevers          Past Medical History  Allergies as of 03/29/2025    (No Known Allergies)       (Not in a hospital admission)       Past Medical History:   Diagnosis Date    GERD (gastroesophageal reflux disease)     Migraine, unspecified, not intractable, without status migrainosus     Migraines    Personal history of other diseases of the digestive system     History of gastroesophageal reflux (GERD)    Personal history of other diseases of the musculoskeletal system and connective tissue     History of fibromyalgia       Past Surgical History:   Procedure Laterality Date    OTHER SURGICAL HISTORY  09/24/2019    Foot surgery    OTHER SURGICAL HISTORY  09/24/2019    Bunionectomy        reports that she has never smoked. She has never used smokeless tobacco. She reports that she does not drink alcohol and does not use drugs.    Review of Systems  Review of Systems   Skin:  Positive for color change.   All other systems reviewed and are negative.                                 Objective    Vitals:    03/29/25 1233   BP: 113/65   BP Location: Left arm   Patient Position: Sitting   BP Cuff Size: Small adult   Pulse: 88   Resp: 18   Temp: 36.8 °C (98.3 °F)   TempSrc: Oral   SpO2: 99%   Weight: 62.6 kg (138 lb)   Height: 1.702 m (5' 7\")     Patient's last menstrual period was 06/10/2024.    Physical Exam  Vitals reviewed.   Constitutional:       Appearance: Normal appearance.   Pulmonary:      Effort: Pulmonary effort is normal.   Skin:     Findings: Erythema present.             Comments: Tender and sore wit mild swelling and erythema   Neurological:      Mental Status: She is alert.         Procedures    Point of Care Test & Imaging Results from this visit  No " results found for this visit on 03/29/25.   Imaging  No results found.    Cardiology, Vascular, and Other Imaging  No other imaging results found for the past 2 days      Diagnostic study results (if any) were reviewed by DEE Lan.    Assessment/Plan   Allergies, medications, history, and pertinent labs/EKGs/Imaging reviewed by DEE Lan.     Medical Decision Making  Pt is breast feeding - has painful and red left breast    Orders and Diagnoses  Encounter Diagnosis   Name Primary?    Mastitis Yes         Medical Admin Record      Patient disposition: Home    Electronically signed by DEE Lan  12:35 PM

## 2026-03-05 ENCOUNTER — APPOINTMENT (OUTPATIENT)
Dept: PRIMARY CARE | Facility: CLINIC | Age: 31
End: 2026-03-05
Payer: COMMERCIAL

## 2026-03-09 ENCOUNTER — APPOINTMENT (OUTPATIENT)
Dept: PRIMARY CARE | Facility: CLINIC | Age: 31
End: 2026-03-09
Payer: COMMERCIAL